# Patient Record
Sex: FEMALE | Race: BLACK OR AFRICAN AMERICAN | NOT HISPANIC OR LATINO
[De-identification: names, ages, dates, MRNs, and addresses within clinical notes are randomized per-mention and may not be internally consistent; named-entity substitution may affect disease eponyms.]

---

## 2019-09-03 ENCOUNTER — RESULT REVIEW (OUTPATIENT)
Age: 24
End: 2019-09-03

## 2023-07-23 ENCOUNTER — EMERGENCY (EMERGENCY)
Facility: HOSPITAL | Age: 28
LOS: 1 days | Discharge: HOME | End: 2023-07-23
Attending: EMERGENCY MEDICINE | Admitting: EMERGENCY MEDICINE
Payer: MEDICAID

## 2023-07-23 VITALS
DIASTOLIC BLOOD PRESSURE: 74 MMHG | HEART RATE: 86 BPM | SYSTOLIC BLOOD PRESSURE: 106 MMHG | RESPIRATION RATE: 18 BRPM | OXYGEN SATURATION: 98 % | WEIGHT: 190.04 LBS | HEIGHT: 64 IN | TEMPERATURE: 98 F

## 2023-07-23 VITALS
TEMPERATURE: 98 F | SYSTOLIC BLOOD PRESSURE: 110 MMHG | OXYGEN SATURATION: 98 % | RESPIRATION RATE: 18 BRPM | HEART RATE: 84 BPM | DIASTOLIC BLOOD PRESSURE: 74 MMHG

## 2023-07-23 PROCEDURE — 99284 EMERGENCY DEPT VISIT MOD MDM: CPT

## 2023-07-23 PROCEDURE — 99283 EMERGENCY DEPT VISIT LOW MDM: CPT

## 2023-07-23 RX ORDER — IBUPROFEN 200 MG
600 TABLET ORAL ONCE
Refills: 0 | Status: COMPLETED | OUTPATIENT
Start: 2023-07-23 | End: 2023-07-23

## 2023-07-23 RX ORDER — CEPHALEXIN 500 MG
500 CAPSULE ORAL EVERY 12 HOURS
Refills: 0 | Status: DISCONTINUED | OUTPATIENT
Start: 2023-07-23 | End: 2023-07-27

## 2023-07-23 RX ORDER — CEPHALEXIN 500 MG
1 CAPSULE ORAL
Qty: 28 | Refills: 0
Start: 2023-07-23 | End: 2023-07-29

## 2023-07-23 RX ORDER — CEPHALEXIN 500 MG
1 CAPSULE ORAL
Qty: 20 | Refills: 0
Start: 2023-07-23 | End: 2023-08-01

## 2023-07-23 RX ADMIN — Medication 500 MILLIGRAM(S): at 21:34

## 2023-07-23 RX ADMIN — Medication 600 MILLIGRAM(S): at 21:34

## 2023-07-23 NOTE — ED PROVIDER NOTE - OBJECTIVE STATEMENT
27 yo female present to ED c/o right inner thigh bite with swelling, induration that began 5 days ago and has worsened. Initially itchy, now tender to touch. Denies fever or chills. Denies drainage. Has tried to apply apple cider vinegar. warm compress but nothing has helped. Patient is in general good health. Denies diabetes.

## 2023-07-23 NOTE — ED PROVIDER NOTE - PATIENT PORTAL LINK FT
Spontaneous, unlabored and symmetrical You can access the FollowMyHealth Patient Portal offered by [facility] by registering at the following website: [email or web address]. By joining [employer]’s FollowMyHealth portal, you will also be able to view your health information using other applications (apps) compatible with our system.

## 2023-07-23 NOTE — ED ADULT TRIAGE NOTE - CHIEF COMPLAINT QUOTE
Patient is 27yo female complaining of infected mosquito bight on the right inner thigh and groin. Patient states that she was bitten 5 days ago. Patient denies discharge from the bite nausea vomiting diarrhea fever

## 2023-07-23 NOTE — ED PROVIDER NOTE - CHPI ED SYMPTOMS NEG
no bleeding/no bleeding at site/no drainage/no purulent drainage/no red streaks/no vomiting/no chills

## 2023-07-23 NOTE — ED ADULT NURSE NOTE - NSFALLUNIVINTERV_ED_ALL_ED
Bed/Stretcher in lowest position, wheels locked, appropriate side rails in place/Call bell, personal items and telephone in reach/Instruct patient to call for assistance before getting out of bed/chair/stretcher/Non-slip footwear applied when patient is off stretcher/[place] to call system/Physically safe environment - no spills, clutter or unnecessary equipment/Purposeful proactive rounding/Room/bathroom lighting operational, light cord in reach

## 2023-07-23 NOTE — ED PROVIDER NOTE - CLINICAL SUMMARY MEDICAL DECISION MAKING FREE TEXT BOX
27 yo female present to ED c/o right inner thigh bite with swelling, induration that began 5 days ago and has worsened. Initially itchy, now tender to touch. Denies fever or chills. Denies drainage. Has tried to apply apple cider vinegar. warm compress but nothing has helped. Patient is in general good health. Denies diabetes.     R inner thigh with focal eyrthema/induration. No fluctuance. Abx/pain management. Follow up outpt.

## 2023-07-23 NOTE — ED ADULT NURSE NOTE - CHIEF COMPLAINT QUOTE
Patient is 29yo female complaining of infected mosquito bight on the right inner thigh and groin. Patient states that she was bitten 5 days ago. Patient denies discharge from the bite nausea vomiting diarrhea fever

## 2023-07-23 NOTE — ED PROVIDER NOTE - SKIN NEGATIVE STATEMENT, MLM
no abrasions, no jaundice, no lesions, no pruritis, and no rashes. right thigh wound with induration.

## 2023-07-23 NOTE — ED ADULT NURSE NOTE - OBJECTIVE STATEMENT
Pt c/o worsening infected mosquito bite on the right inner thigh x bitten 5 days ago. Patient denies discharge from the bite, nausea/vomiting, diarrhea, fever. Nursing care ongoing.

## 2023-07-23 NOTE — ED PROVIDER NOTE - NS ED ATTENDING STATEMENT MOD
This was a shared visit with the JUAN ANTONIO. I reviewed and verified the documentation and independently performed the documented:

## 2023-07-23 NOTE — ED PROVIDER NOTE - NSFOLLOWUPINSTRUCTIONS_ED_ALL_ED_FT
English    Insect Bite, Adult  An insect bite can make your skin red, itchy, and swollen. Some insects can spread disease to people with a bite. However, most insect bites do not lead to disease, and most are not serious.    What are the causes?  Insects may bite for many reasons, including:  Hunger.  To defend themselves.  Insects that bite include:  Spiders.  Mosquitoes.  Flies.  Ticks and fleas.  Ants.  Kissing bugs.  Chiggers.  What are the signs or symptoms?  Symptoms often last for 2–4 days. However, itching can last up to 10 days. Symptoms include:  Itching or pain in the bite area.  Redness and swelling in the bite area.  An open wound.  In rare cases, a person may have a very bad allergic reaction (anaphylactic reaction) to a bite. Symptoms of an anaphylactic reaction may include:  Feeling warm in the face (flushed). Your face may turn red.  Itchy, red, swollen areas of skin (hives).  Swelling of the eyes, lips, face, mouth, tongue, or throat.  Trouble with breathing, talking, or swallowing.  High-pitched whistling sounds, most often when breathing out (wheezing).  Feeling dizzy or light-headed.  Fainting.  Pain or cramps in your belly (abdomen).  Vomiting.  Watery poop (diarrhea).  How is this treated?  Most insect bites are not serious. Symptoms often go away on their own. When treatment is advised, it may include:  Putting ice on the bite area.  Putting a cream or lotion, like calamine lotion, on the bite area. This helps with itching.  Using medicines called antihistamines.  You may also need:  A tetanus shot if you are not up to date.  An antibiotic cream or medicine. This treatment is needed if the bite area gets infected.  Follow these instructions at home:  Bite area care    A sign showing that a person should not scratch an itch on the skin.   Do not scratch the bite area. It may help to cover the bite area with a bandage or close-fitting clothing.  Keep the bite area clean and dry.  Check the bite area every day for signs of infection. Check for:  More redness, swelling, or pain.  Fluid or blood.  Warmth.  Pus or a bad smell.  Wash your hands often.  Managing pain, itching, and swelling    Bag of ice on a towel on the skin.  You may put any of these on the bite area as told by your doctor:  A paste made of baking soda and water.  Cortisone cream.  Calamine lotion.  If told, put ice on the bite area. To do this:  Put ice in a plastic bag.  Place a towel between your skin and the bag.  Leave the ice on for 20 minutes, 2–3 times a day.  If your skin turns bright red, take off the ice right away to prevent skin damage. The risk of skin damage is higher if you cannot feel pain, heat, or cold.  General instructions    Apply or take over-the-counter and prescription medicines only as told by your doctor.  If you were prescribed antibiotics, take or apply them as told by your doctor. Do not stop using them even if you start to feel better.  How is this prevented?  To help you have a lower risk of insect bites:  When you are outside, wear clothes that cover your arms and legs.  Use insect repellent. The best insect repellents contain one of these:  DEET.  Picaridin.  Oil of lemon eucalyptus (OLE).  RK5225.  Consider spraying your clothing with a pesticide called permethrin. Permethrin helps prevent insect bites. It works for several weeks and for up to 5–6 clothing washes. Do not apply permethrin directly to the skin.  If your home windows do not have screens, think about putting some in.  If you will be sleeping in an area where there are mosquitoes, consider covering your sleeping area with a mosquito net.  Contact a doctor if:  You have redness, swelling, or pain in the bite area.  You have fluid or blood coming from the bite area.  The bite area feels warm to the touch.  You have pus or a bad smell coming from the bite area.  You have a fever.  Get help right away if:  You have joint pain.  You have a rash.  You feel weak or more tired than you normally do.  You have neck pain or a headache.  You have signs of an anaphylactic reaction. Signs may include:  Swelling of your eyes, lips, face, mouth, tongue, or throat.  Feeling warm in the face.  Itchy, red, swollen areas of skin.  Trouble with breathing, talking, or swallowing.  Wheezing.  Feeling dizzy or light-headed.  Fainting.  Pain or cramps in your belly.  Vomiting or watery poop.  These symptoms may be an emergency. Get help right away. Call 911.  Do not wait to see if symptoms will go away.  Do not drive yourself to the hospital.  Summary  An insect bite can make your skin red, itchy, and swollen.  Treatment is usually not needed. Symptoms often go away on their own.  Do not scratch the bite area. Keep it clean and dry.  Use insect repellent to help prevent insect bites.  Contact a doctor if you have signs of infection.  This information is not intended to replace advice given to you by your health care provider. Make sure you discuss any questions you have with your health care provider.    Document Revised: 03/14/2023 Document Reviewed: 03/14/2023  Elsevier Patient Education © 2023 [employer]

## 2024-06-29 NOTE — ED ADULT NURSE NOTE - LATERALITY
Return the nearest ED if you develop worsening dizziness, if you are passing out again, or other concerning symptoms.   right

## 2024-12-01 NOTE — ED ADULT TRIAGE NOTE - CCCP TRG CHIEF CMPLNT
She needs to remain on isolation until completion of the antibiotics for 5 days from now.  Pertussis PCR test will be pending.  You may follow this result on MyChart.  We will call you if it comes back positive.  
abscess/bite, insect